# Patient Record
Sex: FEMALE | Race: BLACK OR AFRICAN AMERICAN | Employment: FULL TIME | URBAN - METROPOLITAN AREA
[De-identification: names, ages, dates, MRNs, and addresses within clinical notes are randomized per-mention and may not be internally consistent; named-entity substitution may affect disease eponyms.]

---

## 2018-01-17 PROBLEM — K80.50 BILIARY COLIC: Status: ACTIVE | Noted: 2018-01-17

## 2018-01-17 PROBLEM — K80.20 GALL STONES: Status: ACTIVE | Noted: 2018-01-17

## 2019-04-11 ENCOUNTER — HOSPITAL ENCOUNTER (OUTPATIENT)
Age: 24
Setting detail: SPECIMEN
Discharge: HOME | End: 2019-04-11

## 2019-04-11 PROCEDURE — 86481 TB AG RESPONSE T-CELL SUSP: CPT

## 2019-04-11 PROCEDURE — 86787 VARICELLA-ZOSTER ANTIBODY: CPT

## 2019-04-11 PROCEDURE — 86765 RUBEOLA ANTIBODY: CPT

## 2019-04-11 PROCEDURE — 86762 RUBELLA ANTIBODY: CPT

## 2019-04-11 PROCEDURE — 86735 MUMPS ANTIBODY: CPT

## 2019-04-13 LAB
MUV IGG SER QL: 170 AU/ML
MUV IGG SER QL: NORMAL AU/ML
RUBELLA ANTIBODY IGG: 37.6 IU/ML
RUBELLA ANTIBODY IGG: NORMAL IU/ML
RUBEOLA (MEASLES) AB IGG: 225 AU/ML
RUBEOLA (MEASLES) AB IGG: NORMAL AU/ML
VARICELLA-ZOSTER VIRUS AB, IGG: 2789 IV
VARICELLA-ZOSTER VIRUS AB, IGG: NORMAL IV

## 2019-04-15 LAB
NIL (NEGATIVE) SPOT CONTROL: NORMAL
PANEL A SPOT COUNT: 0
PANEL B SPOT COUNT: 0
POSITIVE CONTROL SPOT COUNT: NORMAL
POSITIVE CONTROL SPOT COUNT: NORMAL
TB CELL IMMUNE MEASURE: NORMAL

## 2019-05-18 ENCOUNTER — HOSPITAL ENCOUNTER (EMERGENCY)
Age: 24
Discharge: HOME | End: 2019-05-18

## 2019-05-18 ENCOUNTER — APPOINTMENT (OUTPATIENT)
Dept: GENERAL RADIOLOGY | Age: 24
End: 2019-05-18

## 2019-05-18 VITALS
HEART RATE: 89 BPM | HEIGHT: 64 IN | DIASTOLIC BLOOD PRESSURE: 79 MMHG | WEIGHT: 220 LBS | RESPIRATION RATE: 16 BRPM | OXYGEN SATURATION: 97 % | SYSTOLIC BLOOD PRESSURE: 144 MMHG | TEMPERATURE: 98.3 F | BODY MASS INDEX: 37.56 KG/M2

## 2019-05-18 DIAGNOSIS — M25.571 ACUTE RIGHT ANKLE PAIN: Primary | ICD-10-CM

## 2019-05-18 PROCEDURE — 99283 EMERGENCY DEPT VISIT LOW MDM: CPT

## 2019-05-18 PROCEDURE — 73630 X-RAY EXAM OF FOOT: CPT

## 2019-05-18 PROCEDURE — 73610 X-RAY EXAM OF ANKLE: CPT

## 2019-05-18 RX ORDER — IBUPROFEN 600 MG/1
600 TABLET ORAL ONCE
Status: DISCONTINUED | OUTPATIENT
Start: 2019-05-18 | End: 2019-05-18 | Stop reason: HOSPADM

## 2019-05-18 RX ORDER — IBUPROFEN 600 MG/1
600 TABLET ORAL EVERY 6 HOURS PRN
Qty: 30 TABLET | Refills: 1 | Status: SHIPPED | OUTPATIENT
Start: 2019-05-18 | End: 2022-10-12

## 2019-05-18 ASSESSMENT — PAIN DESCRIPTION - PROGRESSION: CLINICAL_PROGRESSION: NOT CHANGED

## 2019-05-18 ASSESSMENT — PAIN SCALES - GENERAL
PAINLEVEL_OUTOF10: 10
PAINLEVEL_OUTOF10: 2

## 2019-05-18 ASSESSMENT — PAIN DESCRIPTION - DESCRIPTORS: DESCRIPTORS: THROBBING

## 2019-05-18 ASSESSMENT — PAIN DESCRIPTION - ONSET: ONSET: SUDDEN

## 2019-05-18 ASSESSMENT — PAIN DESCRIPTION - LOCATION: LOCATION: ANKLE

## 2019-05-18 ASSESSMENT — PAIN DESCRIPTION - FREQUENCY: FREQUENCY: CONTINUOUS

## 2019-05-18 ASSESSMENT — PAIN DESCRIPTION - PAIN TYPE: TYPE: ACUTE PAIN

## 2019-05-18 ASSESSMENT — PAIN DESCRIPTION - ORIENTATION: ORIENTATION: LEFT

## 2019-05-18 NOTE — ED PROVIDER NOTES
eMERGENCY dEPARTMENT eNCOUnter         [address] [facility]      PCP: No primary care provider on file. CHIEF COMPLAINT    Chief Complaint   Patient presents with    Ankle Pain     left       HPI    Liza Moore is a 21 y.o. female who presents with pain localized in the Left ankle. Onset was just prior to ED arrival. The context was patient states that 2 months ago, she sustained a mild sprain injury to the lateral left ankle after twisting. Reports she had mild swelling at that time and treated the sprain symptomatically and did not follow-up with orthopedist.  She states that she was doing well until today when she was walking and stepped onto an uneven sidewalk, causing a re-twist injury to the left ankle. She reports a \"pop\" sensation ankle with sudden onset of 10/10 severe sharp throbbing pain throughout the ankle and proximal foot dorsum. She's been nonweightbearing since the injury. Denies any numbness tingling or weakness radiating to the toes but is having difficulty wiggling her toes secondary to ankle pain.  hip, knee or calf pain. no anticoagulation therapy. she immediately placed a tight ace wrap to the area and came to the ed for evaluation. no previous history known of left ankle fracture or surgery. did not take any medications prior to ed arrival.  the symptoms. as with any palpation or movement of the ankle joint.      REVIEW OF SYSTEMS    General: No fever or chills  Head: No headaches, trauma, dizziness  Skin: No lacerations or puncture wounds  Pulmonary: No SOB, wheezing  Cardiovascular: No chest pain, palpitations  Musculoskeletal: Complains of ankle pain - see HPI, No other joint pain, +trauma, +ankle edema  All other review of systems are negative  See HPI and nursing notes for additional information     PAST MEDICAL & SURGICAL HISTORY    Past Medical History:   Diagnosis Date    Antepartum non-reassuring fetal heart rate or rhythm affecting care of mother 2015    Breech presentation 2015    Depression     First pregnancy 2012    Gestational age unknown 2012    High risk teen pregnancy 2012    Poor fetal growth affecting management of mother in third trimester 2015     Past Surgical History:   Procedure Laterality Date     SECTION      CHOLECYSTECTOMY, LAPAROSCOPIC  2018    With intraoperative cholangiogram    HERNIA REPAIR         CURRENT MEDICATIONS    Current Outpatient Rx   Medication Sig Dispense Refill    ibuprofen (ADVIL;MOTRIN) 600 MG tablet Take 1 tablet by mouth every 6 hours as needed for Pain 30 tablet 1    ondansetron (ZOFRAN ODT) 4 MG disintegrating tablet Take 1 tablet by mouth every 8 hours as needed for Nausea or Vomiting 6 tablet 0    acetaminophen (TYLENOL) 325 MG tablet Take 2 tablets by mouth every 4 hours as needed for Pain 120 tablet 3       ALLERGIES    No Known Allergies    SOCIAL & FAMILY HISTORY    Social History     Socioeconomic History    Marital status: Single     Spouse name: None    Number of children: None    Years of education: None    Highest education level: None   Occupational History    None   Social Needs    Financial resource strain: None    Food insecurity:     Worry: None     Inability: None    Transportation needs:     Medical: None     Non-medical: None   Tobacco Use    Smoking status: Never Smoker    Smokeless tobacco: Never Used   Substance and Sexual Activity    Alcohol use: No    Drug use: No    Sexual activity: Yes     Partners: Male   Lifestyle    Physical activity:     Days per week: None     Minutes per session: None    Stress: None   Relationships    Social connections:     Talks on phone: None     Gets together: None     Attends Orthodoxy service: None     Active member of club or organization: None     Attends meetings of clubs or organizations: None     Relationship status: None    Intimate partner violence:     Fear of current or ex partner: None     Emotionally abused: None     Physically abused: None     Forced sexual activity: None   Other Topics Concern    None   Social History Narrative    ** Merged History Encounter **          Family History   Problem Relation Age of Onset    High Cholesterol Mother     Diabetes Mother     High Blood Pressure Mother     Asthma Sister          PHYSICAL EXAM    VITAL SIGNS: BP (!) 144/79   Pulse 89   Temp 98.3 °F (36.8 °C) (Oral)   Resp 16   Ht 5' 4\" (1.626 m)   Wt 220 lb (99.8 kg)   SpO2 97%   BMI 37.76 kg/m²   Constitutional:  Well developed, well nourishe. Very tearful and uncomfortable  HENT:  Atraumatic, Normocephalic, sclera clear and non icteric, moist mucus membrane  Respiratory:  Nonlabored breathing. Musculoskeletal: The left ankle demonstrates intact skin. No gross bony deformities, skin discoloration, erythema, or warmth. There is generalized soft tissue swelling, greatest over lateral ankle and proximal foot dorsum. There is moderate to severe tenderness to palpation throughout the ankle joint and proximal mid foot dorsum with light touch. No palpable bony or soft tissue defects. No tenderness focal over the proximal fifth metatarsal. Compartments are soft throughout the left lower extremity. Unable to assess any active or passive range of motion of the ankle secondary to pain and swelling. The ankle joint is stable. Achilles tendon clinically intact. Able to weakly wiggle toes. No swelling, discoloration, or tenderness to palpation of the proximal to distal lower leg bones,  toes    Vascular:  Posterior tibial and Dorsalis pedis pulse 2+, capillary refill intact. Integument:  No open wounds. Neurologic:  Awake alert, no slurred speech. No foot drop of the affected extremity. DTRs and distal sensation equal/intact.     RADIOLOGY        XR FOOT LEFT (MIN 3 VIEWS) (Preliminary result)   Result time 05/18/19 12:30:27   Procedure changed from XR FOOT LEFT (2 VIEWS)   Preliminary result by Karyna Dominguez MD (05/18/19 12:30:27)                Impression:    1. Acute lateral soft tissue swelling which may relate to lateral collateral  ligament and/or peritoneal injury. 2. Normal left ankle alignment with no acute fracture. 3. Normal left foot alignment with no acute fracture. Narrative:    EXAMINATION:  3 XRAY VIEWS OF THE LEFT ANKLE; 3 XRAY VIEWS OF THE LEFT FOOT    5/18/2019 11:52 am    COMPARISON:  None    HISTORY:  ORDERING SYSTEM PROVIDED HISTORY: trauma  TECHNOLOGIST PROVIDED HISTORY:  Reason for exam:->trauma  Ordering Physician Provided Reason for Exam: lateral ankle pain, rolled ankle  while walking  Acuity: Acute  Type of Exam: Initial    FINDINGS:  Left ankle: Three views provided.  Focal lateral soft tissue swelling. Normal bone mineral density.  Normal alignment and joint space.  No acute  fracture. Left foot: Three views provided.  No focal soft tissue swelling.  Normal bone  mineral density and alignment.  Normal joint spaces.  No acute fracture.                Preliminary result by Karyna Dominguez MD (05/18/19 12:29:40)                Impression:    1. Acute lateral soft tissue swelling which may relate to lateral collateral  ligament and/or peritoneal injury. 2. Normal left ankle alignment with no acute fracture. 3. Normal left foot alignment with no acute fracture.                      XR ANKLE LEFT (MIN 3 VIEWS) (Preliminary result)   Result time 05/18/19 12:30:27   Preliminary result by Karyna Dominguez MD (05/18/19 12:30:27)                Impression:    1. Acute lateral soft tissue swelling which may relate to lateral collateral  ligament and/or peritoneal injury. 2. Normal left ankle alignment with no acute fracture. 3. Normal left foot alignment with no acute fracture.             Narrative:    EXAMINATION:  3 XRAY VIEWS OF THE LEFT ANKLE; 3 XRAY VIEWS OF THE LEFT FOOT    5/18/2019 11:52 On exam, very tearful and uncomfortable 26-year-old female, alert and oriented. She is nonambulatory in ED secondary pain. No gross bony deformities of the left ankle however there is generalized soft tissue swelling throughout the ankle and proximal foot dorsum, worse on the lateral aspect. Moderate-to-severe tenderness palpation throughout the ankle joint with very light touch. Unable to assess any active or passive range of motion secondary to pain but Achilles tendon clinically intact and ankle joint is stable. Neurovascularly intact distally. Focal tenderness over the proximal fifth metatarsal.  Patient is given ibuprofen icepack in the ED. X-rays of the left foot and ankle revealed no acute osseous abnormality including fracture or subluxation however there is acute lateral soft tissue swelling which may be related to a lateral collateral ligament and/or peroneal injury. . Given her severe tenderness, we discussed likely high-grade ankle/foot sprain versus strain but we discussed advanced imaging, CT, of the foot/ankle to rule out a lisfranc instability injury. At this time, patient declines further CT imaging of the foot and ankle and would like to try symptomatic care and outpatient follow-up with orthopedics for advanced imaging as needed. Low clinical suspicion for ischemic limb, compartment syndrome, intra-articular joint infection/septic arthritis, DVT, osteomyelitis, arterial/neurologic injury, or infected/rapidly expanding hematoma. Patient is discharged in stable condition. Educated on [address] therapy. Patient is placed into a ankle aircast stirrup and provided off crutches, may advance off crutches to full weightbearing as tolerated. Provided prescription for anti-inflammatories for pain. Patient is instructed to followup with orthopedics in 7-10 days, sooner with worsened symptoms. Return precautions back to the ED discussed for any new or worsening symptoms.      Diagnosis and plan discussed in detail with patient who understands and agrees. Patient agrees to return emergency department if symptoms worsen or any new symptoms develop. Comment: Please note this report has been produced using speech recognition software and may contain errors related to that system including errors in grammar, punctuation, and spelling, as well as words and phrases that may be inappropriate. If there are any questions or concerns please feel free to contact the dictating provider for clarification.         Danyel Morris PA-C  05/18/19 0489

## 2022-09-26 LAB — PAP SMEAR, EXTERNAL: NORMAL

## 2022-10-10 ENCOUNTER — OFFICE VISIT (OUTPATIENT)
Dept: FAMILY MEDICINE CLINIC | Age: 27
End: 2022-10-10
Payer: COMMERCIAL

## 2022-10-10 ENCOUNTER — TELEPHONE (OUTPATIENT)
Dept: FAMILY MEDICINE CLINIC | Age: 27
End: 2022-10-10

## 2022-10-10 VITALS
HEIGHT: 65 IN | BODY MASS INDEX: 40.49 KG/M2 | WEIGHT: 243.02 LBS | SYSTOLIC BLOOD PRESSURE: 130 MMHG | DIASTOLIC BLOOD PRESSURE: 88 MMHG | HEART RATE: 98 BPM | OXYGEN SATURATION: 99 %

## 2022-10-10 DIAGNOSIS — F32.A ANXIETY AND DEPRESSION: ICD-10-CM

## 2022-10-10 DIAGNOSIS — Z13.1 SCREENING FOR DIABETES MELLITUS: ICD-10-CM

## 2022-10-10 DIAGNOSIS — R73.09 ELEVATED GLUCOSE: ICD-10-CM

## 2022-10-10 DIAGNOSIS — E66.01 CLASS 3 SEVERE OBESITY WITHOUT SERIOUS COMORBIDITY WITH BODY MASS INDEX (BMI) OF 40.0 TO 44.9 IN ADULT, UNSPECIFIED OBESITY TYPE (HCC): ICD-10-CM

## 2022-10-10 DIAGNOSIS — R74.8 ELEVATED LIVER ENZYMES: ICD-10-CM

## 2022-10-10 DIAGNOSIS — N92.1 MENORRHAGIA WITH IRREGULAR CYCLE: ICD-10-CM

## 2022-10-10 DIAGNOSIS — F41.9 ANXIETY AND DEPRESSION: ICD-10-CM

## 2022-10-10 DIAGNOSIS — G43.809 OTHER MIGRAINE WITHOUT STATUS MIGRAINOSUS, NOT INTRACTABLE: ICD-10-CM

## 2022-10-10 DIAGNOSIS — Z76.89 ESTABLISHING CARE WITH NEW DOCTOR, ENCOUNTER FOR: Primary | ICD-10-CM

## 2022-10-10 DIAGNOSIS — Z13.220 SCREENING FOR LIPID DISORDERS: ICD-10-CM

## 2022-10-10 PROCEDURE — G8484 FLU IMMUNIZE NO ADMIN: HCPCS | Performed by: NURSE PRACTITIONER

## 2022-10-10 PROCEDURE — 1036F TOBACCO NON-USER: CPT | Performed by: NURSE PRACTITIONER

## 2022-10-10 PROCEDURE — G8417 CALC BMI ABV UP PARAM F/U: HCPCS | Performed by: NURSE PRACTITIONER

## 2022-10-10 PROCEDURE — 99204 OFFICE O/P NEW MOD 45 MIN: CPT | Performed by: NURSE PRACTITIONER

## 2022-10-10 PROCEDURE — G8427 DOCREV CUR MEDS BY ELIG CLIN: HCPCS | Performed by: NURSE PRACTITIONER

## 2022-10-10 RX ORDER — SUMATRIPTAN 25 MG/1
25 TABLET, FILM COATED ORAL
Qty: 5 TABLET | Refills: 0 | Status: SHIPPED | OUTPATIENT
Start: 2022-10-10 | End: 2022-10-10

## 2022-10-10 RX ORDER — BUSPIRONE HYDROCHLORIDE 5 MG/1
5 TABLET ORAL DAILY
Qty: 30 TABLET | Refills: 0 | Status: SHIPPED | OUTPATIENT
Start: 2022-10-10 | End: 2022-11-09

## 2022-10-10 SDOH — HEALTH STABILITY: PHYSICAL HEALTH: ON AVERAGE, HOW MANY MINUTES DO YOU ENGAGE IN EXERCISE AT THIS LEVEL?: 10 MIN

## 2022-10-10 SDOH — ECONOMIC STABILITY: FOOD INSECURITY: WITHIN THE PAST 12 MONTHS, YOU WORRIED THAT YOUR FOOD WOULD RUN OUT BEFORE YOU GOT MONEY TO BUY MORE.: NEVER TRUE

## 2022-10-10 SDOH — HEALTH STABILITY: PHYSICAL HEALTH: ON AVERAGE, HOW MANY DAYS PER WEEK DO YOU ENGAGE IN MODERATE TO STRENUOUS EXERCISE (LIKE A BRISK WALK)?: 5 DAYS

## 2022-10-10 SDOH — ECONOMIC STABILITY: FOOD INSECURITY: WITHIN THE PAST 12 MONTHS, THE FOOD YOU BOUGHT JUST DIDN'T LAST AND YOU DIDN'T HAVE MONEY TO GET MORE.: NEVER TRUE

## 2022-10-10 ASSESSMENT — PATIENT HEALTH QUESTIONNAIRE - PHQ9
4. FEELING TIRED OR HAVING LITTLE ENERGY: 3
6. FEELING BAD ABOUT YOURSELF - OR THAT YOU ARE A FAILURE OR HAVE LET YOURSELF OR YOUR FAMILY DOWN: 3
SUM OF ALL RESPONSES TO PHQ9 QUESTIONS 1 & 2: 5
5. POOR APPETITE OR OVEREATING: 3
SUM OF ALL RESPONSES TO PHQ QUESTIONS 1-9: 22
10. IF YOU CHECKED OFF ANY PROBLEMS, HOW DIFFICULT HAVE THESE PROBLEMS MADE IT FOR YOU TO DO YOUR WORK, TAKE CARE OF THINGS AT HOME, OR GET ALONG WITH OTHER PEOPLE: 3
8. MOVING OR SPEAKING SO SLOWLY THAT OTHER PEOPLE COULD HAVE NOTICED. OR THE OPPOSITE, BEING SO FIGETY OR RESTLESS THAT YOU HAVE BEEN MOVING AROUND A LOT MORE THAN USUAL: 2
SUM OF ALL RESPONSES TO PHQ QUESTIONS 1-9: 22
2. FEELING DOWN, DEPRESSED OR HOPELESS: 2
9. THOUGHTS THAT YOU WOULD BE BETTER OFF DEAD, OR OF HURTING YOURSELF: 0
1. LITTLE INTEREST OR PLEASURE IN DOING THINGS: 3
SUM OF ALL RESPONSES TO PHQ QUESTIONS 1-9: 22
SUM OF ALL RESPONSES TO PHQ QUESTIONS 1-9: 22
3. TROUBLE FALLING OR STAYING ASLEEP: 3
7. TROUBLE CONCENTRATING ON THINGS, SUCH AS READING THE NEWSPAPER OR WATCHING TELEVISION: 3

## 2022-10-10 ASSESSMENT — COLUMBIA-SUICIDE SEVERITY RATING SCALE - C-SSRS
2. HAVE YOU ACTUALLY HAD ANY THOUGHTS OF KILLING YOURSELF?: NO
6. HAVE YOU EVER DONE ANYTHING, STARTED TO DO ANYTHING, OR PREPARED TO DO ANYTHING TO END YOUR LIFE?: NO
1. WITHIN THE PAST MONTH, HAVE YOU WISHED YOU WERE DEAD OR WISHED YOU COULD GO TO SLEEP AND NOT WAKE UP?: NO

## 2022-10-10 ASSESSMENT — SOCIAL DETERMINANTS OF HEALTH (SDOH)
WITHIN THE LAST YEAR, HAVE YOU BEEN AFRAID OF YOUR PARTNER OR EX-PARTNER?: NO
WITHIN THE LAST YEAR, HAVE YOU BEEN HUMILIATED OR EMOTIONALLY ABUSED IN OTHER WAYS BY YOUR PARTNER OR EX-PARTNER?: NO
WITHIN THE LAST YEAR, HAVE YOU BEEN KICKED, HIT, SLAPPED, OR OTHERWISE PHYSICALLY HURT BY YOUR PARTNER OR EX-PARTNER?: NO
WITHIN THE LAST YEAR, HAVE TO BEEN RAPED OR FORCED TO HAVE ANY KIND OF SEXUAL ACTIVITY BY YOUR PARTNER OR EX-PARTNER?: NO
HOW HARD IS IT FOR YOU TO PAY FOR THE VERY BASICS LIKE FOOD, HOUSING, MEDICAL CARE, AND HEATING?: NOT HARD AT ALL

## 2022-10-10 NOTE — PROGRESS NOTES
10/10/2022     Patsy May (:  1995) is a 32 y.o. female, here for evaluation of the following medical concerns:        Est care. Anxiety and Depression -  Wellbutrin - agitation   Lexapro - headaches   Elavil - didn't help. Also didn't help migraines  Zoloft - tired and hungry   Celexa - no change   \"I wanna stay in bed all of the time and I disassociate\"  Falls asleep fine, but wakes up in the middle of the night and cannot go back to sleep. Denies suicidal thought plan idea self harm. Moods worse one week before and during period. Borderline high BP - has been on 2.5mg norvasc before. FH of high BP  No early stroke MI in FH          Migraines. Last episode Last week - lasted a week. \"My teeth felt like they were going to fall out\"   Happens every couple of months and lasts one week. Has not seen neuro. Last PCP thought it was related to HTN. Does have thunderclap which starts the headache. Reports systolic is usually 985'L. Nothing helps. Does have sensitivity to light and sound. No emesis        Tubal ligation 2016  Has three children  Periods are \" a mess\"  Sometimes getting two periods per month. Reports menstrual blood has Strong smell \"smells like rot\"  \"Really bad cramps\"   Has nausea left pelvic pain shooting down her leg. She just started seeing GYN last month. Started oral birth control. Seeing dr Pushpa Cardoso. No period yet on this birth control. Has not been tested for PCOS. US at ER aug 2022 one week after period - no cysts on ovaries. Hx of elevated liver enzymes. No workup       Obesity - no diet or exercise. Review of Systems    Prior to Visit Medications    Medication Sig Taking?  Authorizing Provider   busPIRone (BUSPAR) 5 MG tablet Take 1 tablet by mouth daily Yes JRERI Sanchez - NP   SUMAtriptan (IMITREX) 25 MG tablet Take 1 tablet by mouth once as needed for Migraine Yes Iron Madrigal APRN - NP ibuprofen (ADVIL;MOTRIN) 600 MG tablet Take 1 tablet by mouth every 6 hours as needed for Pain  Korinne Lusterio, PA-C   ondansetron (ZOFRAN ODT) 4 MG disintegrating tablet Take 1 tablet by mouth every 8 hours as needed for Nausea or Vomiting  Iris Gibson MD   acetaminophen (TYLENOL) 325 MG tablet Take 2 tablets by mouth every 4 hours as needed for Pain  Stanley Solo MD        Social History     Tobacco Use    Smoking status: Never    Smokeless tobacco: Never   Substance Use Topics    Alcohol use: No        Vitals:    10/10/22 1455   BP: 130/88   Site: Left Upper Arm   Position: Sitting   Cuff Size: Large Adult   Pulse: 98   SpO2: 99%   Weight: 243 lb 0.3 oz (110.2 kg)   Height: 5' 5\" (1.651 m)     Estimated body mass index is 40.44 kg/m² as calculated from the following:    Height as of this encounter: 5' 5\" (1.651 m). Weight as of this encounter: 243 lb 0.3 oz (110.2 kg). Physical Exam  Vitals reviewed. Constitutional:       General: She is not in acute distress. Appearance: Normal appearance. She is obese. She is not ill-appearing, toxic-appearing or diaphoretic. HENT:      Head: Normocephalic and atraumatic. Nose: Nose normal.   Eyes:      Extraocular Movements: Extraocular movements intact. Pupils: Pupils are equal, round, and reactive to light. Cardiovascular:      Rate and Rhythm: Normal rate and regular rhythm. Heart sounds: Normal heart sounds. Pulmonary:      Effort: Pulmonary effort is normal.      Breath sounds: Normal breath sounds. Abdominal:      General: Bowel sounds are normal. There is no distension. Palpations: Abdomen is soft. There is no mass. Tenderness: There is no abdominal tenderness. Hernia: No hernia is present. Musculoskeletal:         General: Normal range of motion. Cervical back: Normal range of motion and neck supple. Skin:     General: Skin is warm and dry. Neurological:      General: No focal deficit present. Mental Status: She is alert and oriented to person, place, and time. Mental status is at baseline. Psychiatric:         Mood and Affect: Mood normal.         Behavior: Behavior normal.         Thought Content: Thought content normal.         Judgment: Judgment normal.       ASSESSMENT/PLAN:  1. Establishing care with new doctor, encounter for    2. Anxiety and depression  - [address], Mauri Benito, CNP, [address], Ana Paula marshall    3. Other migraine without status migrainosus, not intractable  - [facility]    4. Class 3 severe obesity without serious comorbidity with body mass index (BMI) of 40.0 to 44.9 in adult, unspecified obesity type (HCC)    5. Elevated glucose  - Hemoglobin A1C; Future    6. Screening for lipid disorders  - Lipid Panel; Future    7. Screening for diabetes mellitus  - Comprehensive Metabolic Panel; Future    8. Elevated liver enzymes    9. Menorrhagia with irregular cycle      PLAN:  Trial buspar once daily   Refer to [address]. Take whichever we can get to first.   Follow up four weeks. Imitrex PRN   Refer to [facility]     Need pap result from dr Elio Villarreal. PATH form given     Fasting labs. Order US if LFT still high - has never been worked up. No gallbladder. All care gaps addressed     All questions answered    Discussed use, benefit, and side effects of prescribed medications. Barriers to compliance discussed. All patient questions answered. Pt voiced understanding. Present to the ER for any emergent or acute symptoms not managed at home or in office. Please note that this chart was generated using dragon dictation software. Although every effort was made to ensure the accuracy of this automated transcription, some errors in transcription may have occurred. No follow-ups on file. An electronic signature was used to authenticate this note.     --JERRI Whitfield - NP on 10/12/2022 at 2:42 PM

## 2022-10-10 NOTE — PATIENT INSTRUCTIONS
[facility]  [address]  [place] [address]  Phone: [phone]          [address], [address]  [phone] (T)  [phone] (f)          Trial buspar once daily. If it makes you tired. Take at bedtime.  Call me with side effects         Fasting labs

## 2022-10-12 PROBLEM — E66.01 CLASS 3 SEVERE OBESITY WITHOUT SERIOUS COMORBIDITY WITH BODY MASS INDEX (BMI) OF 40.0 TO 44.9 IN ADULT (HCC): Status: ACTIVE | Noted: 2022-10-12

## 2022-10-12 PROBLEM — Z13.1 SCREENING FOR DIABETES MELLITUS: Status: ACTIVE | Noted: 2022-10-12

## 2022-10-12 PROBLEM — R73.09 ELEVATED GLUCOSE: Status: ACTIVE | Noted: 2022-10-12

## 2022-10-19 DIAGNOSIS — Z13.1 SCREENING FOR DIABETES MELLITUS: ICD-10-CM

## 2022-10-19 DIAGNOSIS — R73.09 ELEVATED GLUCOSE: ICD-10-CM

## 2022-10-19 DIAGNOSIS — Z13.220 SCREENING FOR LIPID DISORDERS: ICD-10-CM

## 2022-10-19 LAB
A/G RATIO: 1.5 (ref 1.1–2.2)
ALBUMIN SERPL-MCNC: 4.5 G/DL (ref 3.4–5)
ALP BLD-CCNC: 73 U/L (ref 40–129)
ALT SERPL-CCNC: 48 U/L (ref 10–40)
ANION GAP SERPL CALCULATED.3IONS-SCNC: 13 MMOL/L (ref 3–16)
AST SERPL-CCNC: 33 U/L (ref 15–37)
BILIRUB SERPL-MCNC: 0.3 MG/DL (ref 0–1)
BUN BLDV-MCNC: 10 MG/DL (ref 7–20)
CALCIUM SERPL-MCNC: 9.5 MG/DL (ref 8.3–10.6)
CHLORIDE BLD-SCNC: 103 MMOL/L (ref 99–110)
CHOLESTEROL, TOTAL: 203 MG/DL (ref 0–199)
CO2: 23 MMOL/L (ref 21–32)
CREAT SERPL-MCNC: 0.6 MG/DL (ref 0.6–1.1)
GFR SERPL CREATININE-BSD FRML MDRD: >60 ML/MIN/{1.73_M2}
GLUCOSE BLD-MCNC: 78 MG/DL (ref 70–99)
HDLC SERPL-MCNC: 41 MG/DL (ref 40–60)
LDL CHOLESTEROL CALCULATED: 145 MG/DL
POTASSIUM SERPL-SCNC: 4.4 MMOL/L (ref 3.5–5.1)
SODIUM BLD-SCNC: 139 MMOL/L (ref 136–145)
TOTAL PROTEIN: 7.5 G/DL (ref 6.4–8.2)
TRIGL SERPL-MCNC: 84 MG/DL (ref 0–150)
VLDLC SERPL CALC-MCNC: 17 MG/DL

## 2022-10-20 LAB
ESTIMATED AVERAGE GLUCOSE: 111.2 MG/DL
HBA1C MFR BLD: 5.5 %

## 2022-10-24 DIAGNOSIS — R74.8 ELEVATED LIVER ENZYMES: Primary | ICD-10-CM

## 2022-11-07 ENCOUNTER — OFFICE VISIT (OUTPATIENT)
Dept: FAMILY MEDICINE CLINIC | Age: 27
End: 2022-11-07
Payer: COMMERCIAL

## 2022-11-07 VITALS
DIASTOLIC BLOOD PRESSURE: 84 MMHG | OXYGEN SATURATION: 99 % | HEIGHT: 64 IN | WEIGHT: 231.4 LBS | HEART RATE: 84 BPM | BODY MASS INDEX: 39.5 KG/M2 | SYSTOLIC BLOOD PRESSURE: 140 MMHG

## 2022-11-07 DIAGNOSIS — E78.2 MIXED HYPERLIPIDEMIA: ICD-10-CM

## 2022-11-07 DIAGNOSIS — R74.8 ELEVATED LIVER ENZYMES: ICD-10-CM

## 2022-11-07 DIAGNOSIS — G43.919 INTRACTABLE MIGRAINE WITHOUT STATUS MIGRAINOSUS, UNSPECIFIED MIGRAINE TYPE: ICD-10-CM

## 2022-11-07 DIAGNOSIS — F41.9 ANXIETY: Primary | ICD-10-CM

## 2022-11-07 PROCEDURE — 1036F TOBACCO NON-USER: CPT | Performed by: NURSE PRACTITIONER

## 2022-11-07 PROCEDURE — G8484 FLU IMMUNIZE NO ADMIN: HCPCS | Performed by: NURSE PRACTITIONER

## 2022-11-07 PROCEDURE — G8417 CALC BMI ABV UP PARAM F/U: HCPCS | Performed by: NURSE PRACTITIONER

## 2022-11-07 PROCEDURE — 99213 OFFICE O/P EST LOW 20 MIN: CPT | Performed by: NURSE PRACTITIONER

## 2022-11-07 PROCEDURE — G8427 DOCREV CUR MEDS BY ELIG CLIN: HCPCS | Performed by: NURSE PRACTITIONER

## 2022-11-07 RX ORDER — BUSPIRONE HYDROCHLORIDE 5 MG/1
5 TABLET ORAL DAILY
Qty: 90 TABLET | Refills: 1 | Status: SHIPPED | OUTPATIENT
Start: 2022-11-07 | End: 2023-05-06

## 2022-11-07 ASSESSMENT — ENCOUNTER SYMPTOMS
EYES NEGATIVE: 1
RESPIRATORY NEGATIVE: 1
GASTROINTESTINAL NEGATIVE: 1

## 2022-11-07 NOTE — PROGRESS NOTES
SUBJECTIVE:  Farrah Felder   1995   female   No Known Allergies    Chief Complaint   Patient presents with    Follow-up    Anxiety     Wants to review lab work          HPI   Wants to review lab work  Takes Imitrex 1-2 times monthly for acute migraines. Had one this past month that lasted 5 days. Buspar she is taking for anxiety helpful  Works as a  at [employer].     Past Medical History:   Diagnosis Date    Antepartum non-reassuring fetal heart rate or rhythm affecting care of mother 9/18/2015    Breech presentation 9/18/2015    Depression     First pregnancy 1/13/2012    Gestational age unknown 1/13/2012    High risk teen pregnancy 1/13/2012    Poor fetal growth affecting management of mother in third trimester 9/18/2015     Social History     Socioeconomic History    Marital status: Single     Spouse name: Not on file    Number of children: Not on file    Years of education: Not on file    Highest education level: Not on file   Occupational History    Not on file   Tobacco Use    Smoking status: Never    Smokeless tobacco: Never   Substance and Sexual Activity    Alcohol use: No    Drug use: No    Sexual activity: Yes     Partners: Male   Other Topics Concern    Not on file   Social History Narrative    ** Merged History Encounter **          Social Determinants of Health     Financial Resource Strain: Low Risk     Difficulty of Paying Living Expenses: Not hard at all   Food Insecurity: No Food Insecurity    Worried About Running Out of Food in the Last Year: Never true    Shellie of Food in the Last Year: Never true   Transportation Needs: Not on file   Physical Activity: Insufficiently Active    Days of Exercise per Week: 5 days    Minutes of Exercise per Session: 10 min   Stress: Not on file   Social Connections: Not on file   Intimate Partner Violence: Not At Risk    Fear of Current or Ex-Partner: No    Emotionally Abused: No    Physically Abused: No    Sexually Abused: No   Housing Stability: Not on file     Family History   Problem Relation Age of Onset    High Cholesterol Mother     Diabetes Mother     High Blood Pressure Mother     Asthma Sister      Past Surgical History:   Procedure Laterality Date     SECTION      CHOLECYSTECTOMY, LAPAROSCOPIC  2018    With intraoperative cholangiogram    HERNIA REPAIR          Review of Systems   Constitutional: Negative. HENT: Negative. Eyes: Negative. Respiratory: Negative. Cardiovascular: Negative. Gastrointestinal: Negative. Endocrine: Negative. Genitourinary: Negative. Musculoskeletal: Negative. Neurological:  Positive for headaches. Negative for seizures and speech difficulty. Psychiatric/Behavioral:          Anxiety controlled with buspar     OBJECTIVE:  BP (!) 140/84 (Site: Left Upper Arm, Position: Sitting, Cuff Size: Medium Adult)   Pulse 84   Ht 5' 4\" (1.626 m)   Wt 231 lb 6.4 oz (105 kg)   SpO2 99%   BMI 39.72 kg/m²   BP Readings from Last 3 Encounters:   22 (!) 140/84   10/10/22 130/88   19 (!) 144/79     Wt Readings from Last 3 Encounters:   22 231 lb 6.4 oz (105 kg)   10/10/22 243 lb 0.3 oz (110.2 kg)   19 220 lb (99.8 kg)     Body mass index is 39.72 kg/m². Physical Exam  Vitals and nursing note reviewed. Constitutional:       General: She is not in acute distress. Appearance: Normal appearance. She is obese. She is not ill-appearing or toxic-appearing. HENT:      Head: Normocephalic and atraumatic. Right Ear: External ear normal.      Left Ear: External ear normal.      Nose: Nose normal.      Mouth/Throat:      Mouth: Mucous membranes are moist.   Eyes:      Pupils: Pupils are equal, round, and reactive to light. Cardiovascular:      Rate and Rhythm: Normal rate and regular rhythm. Pulses: Normal pulses. Heart sounds: Normal heart sounds.    Pulmonary:      Effort: Pulmonary effort is normal.      Breath sounds: Normal breath sounds. Musculoskeletal:         General: Normal range of motion. Cervical back: Normal range of motion and neck supple. Skin:     General: Skin is warm and dry. Capillary Refill: Capillary refill takes less than 2 seconds. Neurological:      Mental Status: She is alert and oriented to person, place, and time. Psychiatric:         Mood and Affect: Mood normal.         Behavior: Behavior normal.       ASSESSMENT/PLAN:    1. Anxiety  Continue buspar as directed  Encouraged to increase routine activity as tolerated  Healthy diet  Notify provider for new or worsening concerns as needed. 2. Intractable migraine without status migrainosus, unspecified migraine type  Continue imitrex as directed  Seek medical attention for headache that does not resolve or new or worsening symptoms or concerns    3. Elevated liver enzymes  ALT is 48; AST normal  Advised patient to increase routine activity as tolerated and work on weight loss with healthy diet. Will recheck lab work in 6 months. 4. Mixed hyperlipidemia  Encouraged to increase routine exercise as tolerated and healthy food choices with portion control to help with weight loss and cholesterol. Will recheck lab in 6 months. No orders of the defined types were placed in this encounter. Current Outpatient Medications   Medication Sig Dispense Refill    busPIRone (BUSPAR) 5 MG tablet Take 1 tablet by mouth daily 90 tablet 1    SUMAtriptan (IMITREX) 25 MG tablet Take 1 tablet by mouth once as needed for Migraine 5 tablet 0     No current facility-administered medications for this visit. Return in about 6 months (around 5/7/2023). Wojciech Calix, JERRI - CNP DNP, FNP-C    Return for new or worsening symptoms or any concerns as needed.

## 2022-11-11 PROBLEM — Z13.1 SCREENING FOR DIABETES MELLITUS: Status: RESOLVED | Noted: 2022-10-12 | Resolved: 2022-11-11

## 2023-02-16 ENCOUNTER — HOSPITAL ENCOUNTER (EMERGENCY)
Age: 28
Discharge: HOME | End: 2023-02-16
Attending: EMERGENCY MEDICINE
Payer: COMMERCIAL

## 2023-02-16 VITALS
HEART RATE: 110 BPM | SYSTOLIC BLOOD PRESSURE: 156 MMHG | DIASTOLIC BLOOD PRESSURE: 88 MMHG | TEMPERATURE: 99.5 F | RESPIRATION RATE: 16 BRPM | OXYGEN SATURATION: 99 %

## 2023-02-16 DIAGNOSIS — R51.9 ACUTE NONINTRACTABLE HEADACHE, UNSPECIFIED HEADACHE TYPE: ICD-10-CM

## 2023-02-16 DIAGNOSIS — B08.5 HERPANGINA: Primary | ICD-10-CM

## 2023-02-16 LAB
RAPID INFLUENZA  B AGN: NEGATIVE
RAPID INFLUENZA A AGN: NEGATIVE
SARS-COV-2 RDRP RESP QL NAA+PROBE: NOT DETECTED
SOURCE: NORMAL

## 2023-02-16 PROCEDURE — 99284 EMERGENCY DEPT VISIT MOD MDM: CPT

## 2023-02-16 PROCEDURE — 6360000002 HC RX W HCPCS: Performed by: EMERGENCY MEDICINE

## 2023-02-16 PROCEDURE — 87635 SARS-COV-2 COVID-19 AMP PRB: CPT

## 2023-02-16 PROCEDURE — 87804 INFLUENZA ASSAY W/OPTIC: CPT

## 2023-02-16 PROCEDURE — 96372 THER/PROPH/DIAG INJ SC/IM: CPT

## 2023-02-16 PROCEDURE — 6370000000 HC RX 637 (ALT 250 FOR IP): Performed by: EMERGENCY MEDICINE

## 2023-02-16 RX ORDER — IBUPROFEN 800 MG/1
800 TABLET ORAL EVERY 8 HOURS PRN
Qty: 24 TABLET | Refills: 0 | Status: SHIPPED | OUTPATIENT
Start: 2023-02-16

## 2023-02-16 RX ORDER — ACETAMINOPHEN 500 MG
1000 TABLET ORAL
Status: COMPLETED | OUTPATIENT
Start: 2023-02-16 | End: 2023-02-16

## 2023-02-16 RX ORDER — LIDOCAINE HYDROCHLORIDE 20 MG/ML
10 SOLUTION OROPHARYNGEAL 3 TIMES DAILY PRN
Qty: 100 ML | Refills: 0 | Status: SHIPPED | OUTPATIENT
Start: 2023-02-16

## 2023-02-16 RX ORDER — KETOROLAC TROMETHAMINE 30 MG/ML
30 INJECTION, SOLUTION INTRAMUSCULAR; INTRAVENOUS ONCE
Status: COMPLETED | OUTPATIENT
Start: 2023-02-16 | End: 2023-02-16

## 2023-02-16 RX ORDER — TRAMADOL HYDROCHLORIDE 50 MG/1
50 TABLET ORAL EVERY 6 HOURS PRN
Qty: 15 TABLET | Refills: 0 | Status: SHIPPED | OUTPATIENT
Start: 2023-02-16 | End: 2023-02-21

## 2023-02-16 RX ADMIN — ACETAMINOPHEN 1000 MG: 500 TABLET ORAL at 20:20

## 2023-02-16 RX ADMIN — KETOROLAC TROMETHAMINE 30 MG: 30 INJECTION, SOLUTION INTRAMUSCULAR; INTRAVENOUS at 20:20

## 2023-02-16 ASSESSMENT — PAIN - FUNCTIONAL ASSESSMENT
PAIN_FUNCTIONAL_ASSESSMENT: 0-10
PAIN_FUNCTIONAL_ASSESSMENT: PREVENTS OR INTERFERES SOME ACTIVE ACTIVITIES AND ADLS
PAIN_FUNCTIONAL_ASSESSMENT: 0-10
PAIN_FUNCTIONAL_ASSESSMENT: ACTIVITIES ARE NOT PREVENTED

## 2023-02-16 ASSESSMENT — PAIN DESCRIPTION - FREQUENCY: FREQUENCY: CONTINUOUS

## 2023-02-16 ASSESSMENT — PAIN DESCRIPTION - DESCRIPTORS
DESCRIPTORS: ACHING
DESCRIPTORS: BURNING
DESCRIPTORS: SORE;BURNING

## 2023-02-16 ASSESSMENT — PAIN SCALES - GENERAL
PAINLEVEL_OUTOF10: 9
PAINLEVEL_OUTOF10: 10
PAINLEVEL_OUTOF10: 6

## 2023-02-16 ASSESSMENT — PAIN DESCRIPTION - LOCATION
LOCATION: THROAT
LOCATION: HEAD
LOCATION: THROAT

## 2023-02-16 ASSESSMENT — PAIN DESCRIPTION - PAIN TYPE: TYPE: ACUTE PAIN

## 2023-02-17 NOTE — ED NOTES
Patient verbalizes understanding of discharge instructions. Patient walks out of ED with an upright and steady gait with even and unlabored respirations.       Guera Tipton, JULES  02/16/23 1738

## 2023-02-17 NOTE — ED PROVIDER NOTES
EMERGENCY DEPARTMENT ENCOUNTER      CHIEF COMPLAINT:   Malaise  Headache  Sore throat  Chills    HPI: Benita Singleton is a 32 y.o. female who presents to the ED complaining of malaise associated with a headache, sore throat and chills. The patient states her symptoms started 2 to 3 days ago. Her headache has been constant. It is located throughout her head. It feels achy in nature. She states that her throat is burning. The inside of her mouth and gums feel swollen. She was seen at urgent care and was tested for strep which was negative. Symptoms have been persistent. She states she is miserable. She has had subjective fevers and chills. She denies shortness of breath. She denies any other complaints. REVIEW OF SYSTEMS:   Constitutional: See HPI  Eyes:  Denies change in visual acuity  HENT: See HPI  Respiratory:  Denies cough or shortness of breath  Cardiovascular:  Denies chest pain or edema  GI:  Denies abdominal pain, nausea, vomiting, bloody stools or diarrhea  :  Denies dysuria  Musculoskeletal:  Denies back pain or joint pain  Integument:  Denies rash  Neurologic: See HPI  \"Remaining review of systems reviewed and negative. I have reviewed the nursing triage documentation and agree unless otherwise noted below. \"      PAST MEDICAL HISTORY:   Past Medical History:   Diagnosis Date    Antepartum non-reassuring fetal heart rate or rhythm affecting care of mother 2015    Breech presentation 2015    Depression     First pregnancy 2012    Gestational age unknown 2012    High risk teen pregnancy 2012    Poor fetal growth affecting management of mother in third trimester 2015       CURRENT MEDICATIONS:   Home medications reviewed.     SURGICAL HISTORY:   Past Surgical History:   Procedure Laterality Date     SECTION      CHOLECYSTECTOMY, LAPAROSCOPIC  2018    With intraoperative cholangiogram    HERNIA REPAIR         FAMILY HISTORY:   Family History   Problem Relation Age of Onset    High Cholesterol Mother     Diabetes Mother     High Blood Pressure Mother     Asthma Sister        SOCIAL HISTORY:   Social History     Socioeconomic History    Marital status: Single     Spouse name: Not on file    Number of children: Not on file    Years of education: Not on file    Highest education level: Not on file   Occupational History    Not on file   Tobacco Use    Smoking status: Never    Smokeless tobacco: Never   Vaping Use    Vaping Use: Never used   Substance and Sexual Activity    Alcohol use: No    Drug use: No    Sexual activity: Yes     Partners: Male   Other Topics Concern    Not on file   Social History Narrative    ** Merged History Encounter **          Social Determinants of Health     Financial Resource Strain: Low Risk     Difficulty of Paying Living Expenses: Not hard at all   Food Insecurity: No Food Insecurity    Worried About Running Out of Food in the Last Year: Never true    [address] in the Last Year: Never true   Transportation Needs: Not on file   Physical Activity: Insufficiently Active    Days of Exercise per Week: 5 days    Minutes of Exercise per Session: 10 min   Stress: Not on file   Social Connections: Not on file   Intimate Partner Violence: Not At Risk    Fear of Current or Ex-Partner: No    Emotionally Abused: No    Physically Abused: No    Sexually Abused: No   Housing Stability: Not on file       ALLERGIES: Patient has no known allergies. PHYSICAL EXAM:  VITAL SIGNS:   ED Triage Vitals [02/16/23 2012]   Enc Vitals Group      BP (!) 156/88      Heart Rate (!) 110      Resp 16      Temp 99.5 °F (37.5 °C)      Temp Source Infrared      SpO2 99 %      Weight       Height       Head Circumference       Peak Flow       Pain Score       Pain Loc       Pain Edu? Excl. in [address]?       Constitutional:  Non-toxic appearance  HENT: Normocephalic, Atraumatic, Bilateral external ears normal, Oropharynx moist, There is bilateral tonsillar erythema and swelling with ulcerated sores noted to the tonsils, uvula and soft palate, Uvula is midline, No asymmetry, No tongue elevation, Nose normal.  Eyes:  PERRL, Conjunctiva normal, No discharge. Neck: Normal range of motion, No tenderness, Supple, No stridor, No lymphadenopathy, no meningeal signs. Cardiovascular:  Normal heart rate, Normal rhythm  Pulmonary/Chest:  Normal breath sounds, No respiratory distress, No wheezing  Abdomen: Bowel sounds normal, Soft, No tenderness, No masses, No pulsatile masses  Extremities:  Normal range of motion, Intact distal pulses, No edema, No tenderness  Neurologic:  Alert & oriented x 3, Normal motor function, Sensation intact to light touch throughout, No focal deficits  Skin:  Warm, Dry, No erythema, No rash      EKG Interpretation  None    Radiology / Procedures:  Labs Reviewed   COVID-19, RAPID   RAPID INFLUENZA A/B ANTIGENS       ED COURSE & MEDICAL DECISION MAKING:  Monty Sahu is a 32 y.o. female who presents to the ED complaining of malaise associated with a headache, sore throat and chills. The patient states her symptoms started 2 to 3 days ago. Her headache has been constant. It is located throughout her head. It feels achy in nature. She states that her throat is burning. The inside of her mouth and gums feel swollen. She was seen at urgent care and was tested for strep which was negative. Symptoms have been persistent. She states she is miserable. She has had subjective fevers and chills. She denies shortness of breath. She denies any other complaints. History from : Patient    Limitations to history : None    Chronic conditions affecting care: None    Social Determinants : None    Records Reviewed : None    On exam, the patient has an elevated temperature of 99.5 °F.  She is mildly tachycardic on arrival but this resolves by the time of my exam.  She is otherwise hemodynamically stable and neurologically intact.  Physical exam is significant for ulcerated sores noted to the tonsils and soft palate. She is negative for influenza and COVID. She had a strep test in urgent care that was negative. The patient was treated with medications as below and felt significantly better. Patient was given the following medications:  Medications   acetaminophen (TYLENOL) tablet 1,000 mg (1,000 mg Oral Given 2/16/23 2020)   ketorolac (TORADOL) injection 30 mg (30 mg IntraMUSCular Given 2/16/23 2020)       Diagnosis and Differential Diagnosis:  I suspect that the patient has herpangina causing her symptoms. I have a low suspicion for meningitis, airway obstruction, epiglottitis, Bernabe's angina, deep space infection of the neck, peritonsillar abscess, or sepsis. Disposition Considerations:  I feel that the patient is stable for outpatient management with follow up in 2-3 days. Prescriptions for ibuprofen, Ultram and viscous lidocaine will be prescribed as below. The patient is given return precautions. The patient verbalized understanding, was agreeable with plan, and the patient was discharged home in stable condition. I am the Primary Clinician of Record. Clinical Impression:  1. Herpangina    2.  Acute nonintractable headache, unspecified headache type        Disposition referral (if applicable):  JERRI Jack - NP  [address] [address]  [phone]    Schedule an appointment as soon as possible for a visit       [address] [place] [address] Rd  [phone]  Go to   If symptoms worsen    Disposition medications (if applicable):  Discharge Medication List as of 2/16/2023  9:00 PM        START taking these medications    Details   ibuprofen (ADVIL;MOTRIN) 800 MG tablet Take 1 tablet by mouth every 8 hours as needed for Pain, Disp-24 tablet, R-0Normal      lidocaine viscous hcl (XYLOCAINE) 2 % SOLN solution Take 10 mLs by mouth 3 times daily as needed for Irritation, Disp-100 mL, R-0Normal      traMADol (ULTRAM) 50 MG tablet Take 1 tablet by mouth every 6 hours as needed for Pain for up to 5 days. Max Daily Amount: 200 mg, Disp-15 tablet, R-0Normal               Comment: Please note this report has been produced using speech recognition software and may contain errors related to that system including errors in grammar, punctuation, and spelling, as well as words and phrases that may be inappropriate. If there are any questions or concerns please feel free to contact the dictating provider for clarification.          Marianna Bui MD  02/18/23 7889

## 2023-11-22 ENCOUNTER — TELEPHONE (OUTPATIENT)
Dept: FAMILY MEDICINE CLINIC | Age: 28
End: 2023-11-22

## 2024-01-18 ENCOUNTER — OFFICE VISIT (OUTPATIENT)
Dept: FAMILY MEDICINE CLINIC | Age: 29
End: 2024-01-18
Payer: COMMERCIAL

## 2024-01-18 VITALS
WEIGHT: 222.6 LBS | OXYGEN SATURATION: 97 % | HEART RATE: 79 BPM | BODY MASS INDEX: 38 KG/M2 | DIASTOLIC BLOOD PRESSURE: 70 MMHG | HEIGHT: 64 IN | SYSTOLIC BLOOD PRESSURE: 124 MMHG

## 2024-01-18 DIAGNOSIS — Z84.81 FAMILY HISTORY OF BRCA GENE POSITIVE: ICD-10-CM

## 2024-01-18 DIAGNOSIS — F32.A ANXIETY AND DEPRESSION: ICD-10-CM

## 2024-01-18 DIAGNOSIS — Z00.00 ANNUAL PHYSICAL EXAM: Primary | ICD-10-CM

## 2024-01-18 DIAGNOSIS — F41.0 ANXIETY ATTACK: Chronic | ICD-10-CM

## 2024-01-18 DIAGNOSIS — F41.9 ANXIETY AND DEPRESSION: ICD-10-CM

## 2024-01-18 PROCEDURE — G8484 FLU IMMUNIZE NO ADMIN: HCPCS | Performed by: NURSE PRACTITIONER

## 2024-01-18 PROCEDURE — 99395 PREV VISIT EST AGE 18-39: CPT | Performed by: NURSE PRACTITIONER

## 2024-01-18 RX ORDER — ESCITALOPRAM OXALATE 10 MG/1
10 TABLET ORAL DAILY
Qty: 30 TABLET | Refills: 1 | Status: SHIPPED | OUTPATIENT
Start: 2024-01-18

## 2024-01-18 ASSESSMENT — PATIENT HEALTH QUESTIONNAIRE - PHQ9
9. THOUGHTS THAT YOU WOULD BE BETTER OFF DEAD, OR OF HURTING YOURSELF: NOT AT ALL
1. LITTLE INTEREST OR PLEASURE IN DOING THINGS: MORE THAN HALF THE DAYS
1. LITTLE INTEREST OR PLEASURE IN DOING THINGS: 2
3. TROUBLE FALLING OR STAYING ASLEEP: 1
5. POOR APPETITE OR OVEREATING: MORE THAN HALF THE DAYS
7. TROUBLE CONCENTRATING ON THINGS, SUCH AS READING THE NEWSPAPER OR WATCHING TELEVISION: SEVERAL DAYS
8. MOVING OR SPEAKING SO SLOWLY THAT OTHER PEOPLE COULD HAVE NOTICED. OR THE OPPOSITE - BEING SO FIDGETY OR RESTLESS THAT YOU HAVE BEEN MOVING AROUND A LOT MORE THAN USUAL: SEVERAL DAYS
SUM OF ALL RESPONSES TO PHQ QUESTIONS 1-9: 12
SUM OF ALL RESPONSES TO PHQ9 QUESTIONS 1 & 2: 4
4. FEELING TIRED OR HAVING LITTLE ENERGY: 1
10. IF YOU CHECKED OFF ANY PROBLEMS, HOW DIFFICULT HAVE THESE PROBLEMS MADE IT FOR YOU TO DO YOUR WORK, TAKE CARE OF THINGS AT HOME, OR GET ALONG WITH OTHER PEOPLE: 1
2. FEELING DOWN, DEPRESSED OR HOPELESS: 2
SUM OF ALL RESPONSES TO PHQ QUESTIONS 1-9: 12
6. FEELING BAD ABOUT YOURSELF - OR THAT YOU ARE A FAILURE OR HAVE LET YOURSELF OR YOUR FAMILY DOWN: 2
7. TROUBLE CONCENTRATING ON THINGS, SUCH AS READING THE NEWSPAPER OR WATCHING TELEVISION: 1
10. IF YOU CHECKED OFF ANY PROBLEMS, HOW DIFFICULT HAVE THESE PROBLEMS MADE IT FOR YOU TO DO YOUR WORK, TAKE CARE OF THINGS AT HOME, OR GET ALONG WITH OTHER PEOPLE: SOMEWHAT DIFFICULT
2. FEELING DOWN, DEPRESSED OR HOPELESS: MORE THAN HALF THE DAYS
9. THOUGHTS THAT YOU WOULD BE BETTER OFF DEAD, OR OF HURTING YOURSELF: 0
8. MOVING OR SPEAKING SO SLOWLY THAT OTHER PEOPLE COULD HAVE NOTICED. OR THE OPPOSITE, BEING SO FIGETY OR RESTLESS THAT YOU HAVE BEEN MOVING AROUND A LOT MORE THAN USUAL: 1
6. FEELING BAD ABOUT YOURSELF - OR THAT YOU ARE A FAILURE OR HAVE LET YOURSELF OR YOUR FAMILY DOWN: MORE THAN HALF THE DAYS
4. FEELING TIRED OR HAVING LITTLE ENERGY: SEVERAL DAYS
3. TROUBLE FALLING OR STAYING ASLEEP: SEVERAL DAYS
5. POOR APPETITE OR OVEREATING: 2
SUM OF ALL RESPONSES TO PHQ QUESTIONS 1-9: 12

## 2024-01-18 NOTE — PATIENT INSTRUCTIONS
[facility] health.   [phone]    [email or web address]    [place] - Telehealth Only option  Phone: [phone]      At [facility], there’s no one-size-fits-all approach to mental health. We tailor our care plans to fit each person’s unique needs. Our clinicians include psychiatrists, psychologists, and licensed therapists who are ready to support you. [facility] offers both in-person and telehealth appointments, so you get the care you need in the format that serves you best. We also accept most insurance plans, allowing you to get the most from your personalized care plan.        [place], [place] - [address]  [address]  [place] [address]  Phone: [phone]  Fax: [phone]    [place], [place] - [address] [facility]  [address], [address]  [place] [address]  Phone: [phone]  Fax: [phone]      [facility] [address]   [address], [address]  [place] [address]  Phone: [phone]  Fax: [phone]          [place], [place] - [address]  [address] [facility], [address]  [place] [address]  Phone: [phone]  Fax: [phone]        [place], [place] - [address]  [address], [address]  [place] [address]  Phone: [phone]  Fax: [phone]       [place], [place] - [address]  [address], [address]  [place] [address]  Phone: [phone]  Fax: [phone]      [place], [place] - [address]  [address] [place] [address]  Phone: [phone]  Fax: [phone]       [place], [place] - [address]  [address] [place] [address]  Phone: [phone]  Fax: [phone]            [place], [place] - [address]  [address] [place] [address]  Phone: [phone]  Fax: [phone]      [place], [place] - [address]  [address] [place] [address]  Phone:

## 2024-01-18 NOTE — PROGRESS NOTES
2024     Angelic hTompson (:  1995) is a 28 y.o. female, here for evaluation of the following medical concerns:      Annual   Multiple concerns     BRCA gene in family - aunt with double mastectomy dec 2023. This I her moms twin   JOSE D  in 40s with ovarian cancer.         Concerned for weight,. Wanting weight loss help.   BMI 38        MH   Anxiety depression   Not medicated. Has been on wellbutrin (made her angry)   zoloft (gave her headaches)  \"Ill start spiraling with everything going on and then it gets me down\"   She wants medication.   Denies suicidal self harm thought plan idea      Last pap  benign           Review of Systems    Prior to Visit Medications    Medication Sig Taking? Authorizing Provider   escitalopram (LEXAPRO) 10 MG tablet Take 1 tablet by mouth daily Yes Rossy Rousseau, JERRI - NP        Social History     Tobacco Use   • Smoking status: Never   • Smokeless tobacco: Never   Substance Use Topics   • Alcohol use: No        Vitals:    24 0859   BP: 124/70   Site: Left Upper Arm   Position: Sitting   Pulse: 79   SpO2: 97%   Weight: 101 kg (222 lb 9.6 oz)   Height: 1.626 m (5' 4\")     Estimated body mass index is 38.21 kg/m² as calculated from the following:    Height as of this encounter: 1.626 m (5' 4\").    Weight as of this encounter: 101 kg (222 lb 9.6 oz).    Physical Exam  Constitutional:       General: She is not in acute distress.     Appearance: Normal appearance. She is obese. She is not ill-appearing, toxic-appearing or diaphoretic.   Eyes:      Extraocular Movements: Extraocular movements intact.      Pupils: Pupils are equal, round, and reactive to light.   Cardiovascular:      Rate and Rhythm: Normal rate and regular rhythm.   Pulmonary:      Effort: Pulmonary effort is normal.      Breath sounds: Normal breath sounds. No wheezing or rhonchi.   Musculoskeletal:         General: Normal range of motion.      Cervical back: Normal range of motion.   Skin:

## 2024-03-07 ENCOUNTER — OFFICE VISIT (OUTPATIENT)
Dept: FAMILY MEDICINE CLINIC | Age: 29
End: 2024-03-07
Payer: COMMERCIAL

## 2024-03-07 VITALS
DIASTOLIC BLOOD PRESSURE: 86 MMHG | OXYGEN SATURATION: 98 % | WEIGHT: 221.8 LBS | SYSTOLIC BLOOD PRESSURE: 138 MMHG | BODY MASS INDEX: 37.87 KG/M2 | HEART RATE: 79 BPM | HEIGHT: 64 IN

## 2024-03-07 DIAGNOSIS — F41.9 ANXIETY AND DEPRESSION: Primary | ICD-10-CM

## 2024-03-07 DIAGNOSIS — F32.A ANXIETY AND DEPRESSION: Primary | ICD-10-CM

## 2024-03-07 PROCEDURE — 1036F TOBACCO NON-USER: CPT | Performed by: NURSE PRACTITIONER

## 2024-03-07 PROCEDURE — G8484 FLU IMMUNIZE NO ADMIN: HCPCS | Performed by: NURSE PRACTITIONER

## 2024-03-07 PROCEDURE — G8427 DOCREV CUR MEDS BY ELIG CLIN: HCPCS | Performed by: NURSE PRACTITIONER

## 2024-03-07 PROCEDURE — G8417 CALC BMI ABV UP PARAM F/U: HCPCS | Performed by: NURSE PRACTITIONER

## 2024-03-07 PROCEDURE — 99214 OFFICE O/P EST MOD 30 MIN: CPT | Performed by: NURSE PRACTITIONER

## 2024-03-07 RX ORDER — ESCITALOPRAM OXALATE 20 MG/1
20 TABLET ORAL DAILY
Qty: 90 TABLET | Refills: 0 | Status: SHIPPED | OUTPATIENT
Start: 2024-03-07 | End: 2024-06-05

## 2024-03-07 SDOH — ECONOMIC STABILITY: FOOD INSECURITY: WITHIN THE PAST 12 MONTHS, THE FOOD YOU BOUGHT JUST DIDN'T LAST AND YOU DIDN'T HAVE MONEY TO GET MORE.: NEVER TRUE

## 2024-03-07 SDOH — ECONOMIC STABILITY: FOOD INSECURITY: WITHIN THE PAST 12 MONTHS, YOU WORRIED THAT YOUR FOOD WOULD RUN OUT BEFORE YOU GOT MONEY TO BUY MORE.: NEVER TRUE

## 2024-03-07 SDOH — ECONOMIC STABILITY: INCOME INSECURITY: HOW HARD IS IT FOR YOU TO PAY FOR THE VERY BASICS LIKE FOOD, HOUSING, MEDICAL CARE, AND HEATING?: NOT HARD AT ALL

## 2024-03-07 SDOH — ECONOMIC STABILITY: HOUSING INSECURITY
IN THE LAST 12 MONTHS, WAS THERE A TIME WHEN YOU DID NOT HAVE A STEADY PLACE TO SLEEP OR SLEPT IN A SHELTER (INCLUDING NOW)?: NO

## 2024-03-07 ASSESSMENT — PATIENT HEALTH QUESTIONNAIRE - PHQ9
7. TROUBLE CONCENTRATING ON THINGS, SUCH AS READING THE NEWSPAPER OR WATCHING TELEVISION: 2
3. TROUBLE FALLING OR STAYING ASLEEP: 2
SUM OF ALL RESPONSES TO PHQ9 QUESTIONS 1 & 2: 3
9. THOUGHTS THAT YOU WOULD BE BETTER OFF DEAD, OR OF HURTING YOURSELF: 0
SUM OF ALL RESPONSES TO PHQ QUESTIONS 1-9: 12
SUM OF ALL RESPONSES TO PHQ QUESTIONS 1-9: 12
8. MOVING OR SPEAKING SO SLOWLY THAT OTHER PEOPLE COULD HAVE NOTICED. OR THE OPPOSITE, BEING SO FIGETY OR RESTLESS THAT YOU HAVE BEEN MOVING AROUND A LOT MORE THAN USUAL: 0
10. IF YOU CHECKED OFF ANY PROBLEMS, HOW DIFFICULT HAVE THESE PROBLEMS MADE IT FOR YOU TO DO YOUR WORK, TAKE CARE OF THINGS AT HOME, OR GET ALONG WITH OTHER PEOPLE: 1
5. POOR APPETITE OR OVEREATING: 2
2. FEELING DOWN, DEPRESSED OR HOPELESS: 1
SUM OF ALL RESPONSES TO PHQ QUESTIONS 1-9: 12
4. FEELING TIRED OR HAVING LITTLE ENERGY: 2
SUM OF ALL RESPONSES TO PHQ QUESTIONS 1-9: 12
6. FEELING BAD ABOUT YOURSELF - OR THAT YOU ARE A FAILURE OR HAVE LET YOURSELF OR YOUR FAMILY DOWN: 1
1. LITTLE INTEREST OR PLEASURE IN DOING THINGS: 2

## 2024-03-07 ASSESSMENT — ANXIETY QUESTIONNAIRES
IF YOU CHECKED OFF ANY PROBLEMS ON THIS QUESTIONNAIRE, HOW DIFFICULT HAVE THESE PROBLEMS MADE IT FOR YOU TO DO YOUR WORK, TAKE CARE OF THINGS AT HOME, OR GET ALONG WITH OTHER PEOPLE: SOMEWHAT DIFFICULT
GAD7 TOTAL SCORE: 16
2. NOT BEING ABLE TO STOP OR CONTROL WORRYING: 3
3. WORRYING TOO MUCH ABOUT DIFFERENT THINGS: 3
7. FEELING AFRAID AS IF SOMETHING AWFUL MIGHT HAPPEN: 1
5. BEING SO RESTLESS THAT IT IS HARD TO SIT STILL: 2
6. BECOMING EASILY ANNOYED OR IRRITABLE: 2
4. TROUBLE RELAXING: 3
1. FEELING NERVOUS, ANXIOUS, OR ON EDGE: 2

## 2024-03-07 NOTE — PROGRESS NOTES
3/7/2024     Angelic Thompson (:  1995) is a 28 y.o. female, here for evaluation of the following medical concerns:      Depression is getting better but the anxiety is the same. Still has paranoia and racing thoughts. Denies side effects. Denies suicidal self harm thought plan idea  Wanting to increase dose.       Sees [facility] for consult next week.         Review of Systems    Prior to Visit Medications    Medication Sig Taking? Authorizing Provider   escitalopram (LEXAPRO) 20 MG tablet Take 1 tablet by mouth daily Yes Rossy Rousseau, APRN - NP        Social History     Tobacco Use    Smoking status: Never    Smokeless tobacco: Never   Substance Use Topics    Alcohol use: No        Vitals:    24 0932   BP: 138/86   Site: Right Upper Arm   Position: Sitting   Pulse: 79   SpO2: 98%   Weight: 100.6 kg (221 lb 12.8 oz)   Height: 1.626 m (5' 4\")     Estimated body mass index is 38.07 kg/m² as calculated from the following:    Height as of this encounter: 1.626 m (5' 4\").    Weight as of this encounter: 100.6 kg (221 lb 12.8 oz).    Physical Exam  Constitutional:       General: She is not in acute distress.     Appearance: Normal appearance. She is not ill-appearing, toxic-appearing or diaphoretic.   Cardiovascular:      Rate and Rhythm: Normal rate.   Pulmonary:      Effort: Pulmonary effort is normal.   Neurological:      General: No focal deficit present.      Mental Status: She is alert and oriented to person, place, and time. Mental status is at baseline.   Psychiatric:         Mood and Affect: Mood normal.         Behavior: Behavior normal.         Thought Content: Thought content normal.         Judgment: Judgment normal.         ASSESSMENT/PLAN:  1. Anxiety and depression  2. BMI 38.0-38.9,adult      Increase to 20mg daily   Follow up 8 weeks   MH refs provided.       All care gaps addressed     All questions answered    Discussed use, benefit, and side effects of prescribed medications.  Barriers

## 2024-05-02 ENCOUNTER — OFFICE VISIT (OUTPATIENT)
Dept: FAMILY MEDICINE CLINIC | Age: 29
End: 2024-05-02
Payer: COMMERCIAL

## 2024-05-02 VITALS
WEIGHT: 228.6 LBS | BODY MASS INDEX: 39.03 KG/M2 | HEIGHT: 64 IN | SYSTOLIC BLOOD PRESSURE: 132 MMHG | OXYGEN SATURATION: 97 % | DIASTOLIC BLOOD PRESSURE: 80 MMHG | HEART RATE: 78 BPM

## 2024-05-02 DIAGNOSIS — F41.9 ANXIETY AND DEPRESSION: Primary | ICD-10-CM

## 2024-05-02 DIAGNOSIS — F32.A ANXIETY AND DEPRESSION: Primary | ICD-10-CM

## 2024-05-02 PROCEDURE — G8417 CALC BMI ABV UP PARAM F/U: HCPCS | Performed by: NURSE PRACTITIONER

## 2024-05-02 PROCEDURE — 1036F TOBACCO NON-USER: CPT | Performed by: NURSE PRACTITIONER

## 2024-05-02 PROCEDURE — G8427 DOCREV CUR MEDS BY ELIG CLIN: HCPCS | Performed by: NURSE PRACTITIONER

## 2024-05-02 PROCEDURE — 99214 OFFICE O/P EST MOD 30 MIN: CPT | Performed by: NURSE PRACTITIONER

## 2024-05-02 RX ORDER — BUPROPION HYDROCHLORIDE 150 MG/1
150 TABLET ORAL EVERY MORNING
Qty: 90 TABLET | Refills: 1 | Status: SHIPPED | OUTPATIENT
Start: 2024-05-02

## 2024-05-02 ASSESSMENT — ANXIETY QUESTIONNAIRES
2. NOT BEING ABLE TO STOP OR CONTROL WORRYING: MORE THAN HALF THE DAYS
6. BECOMING EASILY ANNOYED OR IRRITABLE: NEARLY EVERY DAY
1. FEELING NERVOUS, ANXIOUS, OR ON EDGE: MORE THAN HALF THE DAYS
7. FEELING AFRAID AS IF SOMETHING AWFUL MIGHT HAPPEN: MORE THAN HALF THE DAYS
GAD7 TOTAL SCORE: 12
IF YOU CHECKED OFF ANY PROBLEMS ON THIS QUESTIONNAIRE, HOW DIFFICULT HAVE THESE PROBLEMS MADE IT FOR YOU TO DO YOUR WORK, TAKE CARE OF THINGS AT HOME, OR GET ALONG WITH OTHER PEOPLE: VERY DIFFICULT
4. TROUBLE RELAXING: SEVERAL DAYS
5. BEING SO RESTLESS THAT IT IS HARD TO SIT STILL: NOT AT ALL
3. WORRYING TOO MUCH ABOUT DIFFERENT THINGS: MORE THAN HALF THE DAYS

## 2024-05-02 ASSESSMENT — PATIENT HEALTH QUESTIONNAIRE - PHQ9
SUM OF ALL RESPONSES TO PHQ QUESTIONS 1-9: 12
SUM OF ALL RESPONSES TO PHQ QUESTIONS 1-9: 12
9. THOUGHTS THAT YOU WOULD BE BETTER OFF DEAD, OR OF HURTING YOURSELF: NOT AT ALL
SUM OF ALL RESPONSES TO PHQ9 QUESTIONS 1 & 2: 3
10. IF YOU CHECKED OFF ANY PROBLEMS, HOW DIFFICULT HAVE THESE PROBLEMS MADE IT FOR YOU TO DO YOUR WORK, TAKE CARE OF THINGS AT HOME, OR GET ALONG WITH OTHER PEOPLE: VERY DIFFICULT
7. TROUBLE CONCENTRATING ON THINGS, SUCH AS READING THE NEWSPAPER OR WATCHING TELEVISION: SEVERAL DAYS
SUM OF ALL RESPONSES TO PHQ QUESTIONS 1-9: 12
3. TROUBLE FALLING OR STAYING ASLEEP: NEARLY EVERY DAY
5. POOR APPETITE OR OVEREATING: MORE THAN HALF THE DAYS
8. MOVING OR SPEAKING SO SLOWLY THAT OTHER PEOPLE COULD HAVE NOTICED. OR THE OPPOSITE, BEING SO FIGETY OR RESTLESS THAT YOU HAVE BEEN MOVING AROUND A LOT MORE THAN USUAL: NOT AT ALL
2. FEELING DOWN, DEPRESSED OR HOPELESS: SEVERAL DAYS
6. FEELING BAD ABOUT YOURSELF - OR THAT YOU ARE A FAILURE OR HAVE LET YOURSELF OR YOUR FAMILY DOWN: NOT AT ALL
1. LITTLE INTEREST OR PLEASURE IN DOING THINGS: MORE THAN HALF THE DAYS
4. FEELING TIRED OR HAVING LITTLE ENERGY: NEARLY EVERY DAY
SUM OF ALL RESPONSES TO PHQ QUESTIONS 1-9: 12

## 2024-06-13 ENCOUNTER — OFFICE VISIT (OUTPATIENT)
Dept: FAMILY MEDICINE CLINIC | Age: 29
End: 2024-06-13
Payer: COMMERCIAL

## 2024-06-13 VITALS
SYSTOLIC BLOOD PRESSURE: 134 MMHG | BODY MASS INDEX: 39.95 KG/M2 | HEART RATE: 90 BPM | HEIGHT: 64 IN | DIASTOLIC BLOOD PRESSURE: 80 MMHG | OXYGEN SATURATION: 98 % | WEIGHT: 234 LBS

## 2024-06-13 DIAGNOSIS — F32.A ANXIETY AND DEPRESSION: Primary | ICD-10-CM

## 2024-06-13 DIAGNOSIS — F41.9 ANXIETY AND DEPRESSION: Primary | ICD-10-CM

## 2024-06-13 PROCEDURE — 1036F TOBACCO NON-USER: CPT | Performed by: NURSE PRACTITIONER

## 2024-06-13 PROCEDURE — 99214 OFFICE O/P EST MOD 30 MIN: CPT | Performed by: NURSE PRACTITIONER

## 2024-06-13 PROCEDURE — G8417 CALC BMI ABV UP PARAM F/U: HCPCS | Performed by: NURSE PRACTITIONER

## 2024-06-13 PROCEDURE — G8427 DOCREV CUR MEDS BY ELIG CLIN: HCPCS | Performed by: NURSE PRACTITIONER

## 2024-06-13 RX ORDER — ESCITALOPRAM OXALATE 20 MG/1
20 TABLET ORAL DAILY
Qty: 90 TABLET | Refills: 1 | Status: SHIPPED | OUTPATIENT
Start: 2024-06-13 | End: 2024-09-11

## 2024-06-13 NOTE — PROGRESS NOTES
2024     Angelic Thompson (:  1995) is a 29 y.o. female, here for evaluation of the following medical concerns:    Follow up moods   Added wellbutrin 150 six weeks ago   States she is doing better with adding that. The wellbutrin \"is actually  making the lexapro work and is lasting longer and keeping me more awake and helping with my moods and anxiety\"   Denies suicidal self harm thought plan idea  Denies side effects   Lexapro 20             Review of Systems    Prior to Visit Medications    Medication Sig Taking? Authorizing Provider   escitalopram (LEXAPRO) 20 MG tablet Take 1 tablet by mouth daily Yes Rossy Rousseau APRN - NP   buPROPion (WELLBUTRIN XL) 150 MG extended release tablet Take 1 tablet by mouth every morning Yes Rossy Rousseau APRN - NP        Social History     Tobacco Use    Smoking status: Never    Smokeless tobacco: Never   Substance Use Topics    Alcohol use: No        Vitals:    24 0836   BP: 134/80   Site: Left Upper Arm   Position: Sitting   Cuff Size: Large Adult   Pulse: 90   SpO2: 98%   Weight: 106.1 kg (234 lb)   Height: 1.626 m (5' 4.02\")     Estimated body mass index is 40.15 kg/m² as calculated from the following:    Height as of this encounter: 1.626 m (5' 4.02\").    Weight as of this encounter: 106.1 kg (234 lb).    Physical Exam  Constitutional:       General: She is not in acute distress.     Appearance: Normal appearance. She is not ill-appearing, toxic-appearing or diaphoretic.   Neurological:      Mental Status: She is alert.   Psychiatric:         Mood and Affect: Mood normal.         Behavior: Behavior normal.         Thought Content: Thought content normal.         Judgment: Judgment normal.         ASSESSMENT/PLAN:  1. Anxiety and depression      Cont yeyo 20 and wellbutrin 150  Follow up 3 months     Discussed possible side effects and to call if Wellbutrin 150 is not working anymore.  Will see her in 3 months anyways.  Refill      All care gaps